# Patient Record
Sex: MALE | Race: WHITE | Employment: OTHER | URBAN - METROPOLITAN AREA
[De-identification: names, ages, dates, MRNs, and addresses within clinical notes are randomized per-mention and may not be internally consistent; named-entity substitution may affect disease eponyms.]

---

## 2019-04-11 ENCOUNTER — APPOINTMENT (OUTPATIENT)
Dept: GENERAL RADIOLOGY | Age: 64
End: 2019-04-11
Payer: COMMERCIAL

## 2019-04-11 ENCOUNTER — HOSPITAL ENCOUNTER (EMERGENCY)
Age: 64
Discharge: LEFT BEFORE DISCHARGE | End: 2019-04-11
Payer: COMMERCIAL

## 2019-04-11 ENCOUNTER — HOSPITAL ENCOUNTER (EMERGENCY)
Age: 64
Discharge: HOME | End: 2019-04-11
Attending: EMERGENCY MEDICINE
Payer: COMMERCIAL

## 2019-04-11 VITALS
HEART RATE: 80 BPM | SYSTOLIC BLOOD PRESSURE: 194 MMHG | OXYGEN SATURATION: 98 % | BODY MASS INDEX: 28.44 KG/M2 | DIASTOLIC BLOOD PRESSURE: 102 MMHG | RESPIRATION RATE: 14 BRPM | HEIGHT: 69 IN | TEMPERATURE: 97.2 F | WEIGHT: 192 LBS

## 2019-04-11 VITALS
SYSTOLIC BLOOD PRESSURE: 204 MMHG | BODY MASS INDEX: 27.49 KG/M2 | HEART RATE: 96 BPM | HEIGHT: 70 IN | OXYGEN SATURATION: 95 % | TEMPERATURE: 98.6 F | DIASTOLIC BLOOD PRESSURE: 100 MMHG | RESPIRATION RATE: 16 BRPM | WEIGHT: 192 LBS

## 2019-04-11 DIAGNOSIS — S61.213S: Primary | ICD-10-CM

## 2019-04-11 DIAGNOSIS — S62.635B OPEN DISPLACED FRACTURE OF DISTAL PHALANX OF LEFT RING FINGER, INITIAL ENCOUNTER: Primary | ICD-10-CM

## 2019-04-11 DIAGNOSIS — S62.609B OPEN FRACTURE OF PHALANX OF DIGIT OF HAND, INITIAL ENCOUNTER: ICD-10-CM

## 2019-04-11 LAB
ANION GAP SERPL CALCULATED.3IONS-SCNC: 13 MMOL/L (ref 7–16)
BASOPHILS ABSOLUTE: 0.05 E9/L (ref 0–0.2)
BASOPHILS RELATIVE PERCENT: 0.7 % (ref 0–2)
BUN BLDV-MCNC: 23 MG/DL (ref 8–23)
CALCIUM SERPL-MCNC: 9.2 MG/DL (ref 8.6–10.2)
CHLORIDE BLD-SCNC: 100 MMOL/L (ref 98–107)
CO2: 25 MMOL/L (ref 22–29)
CREAT SERPL-MCNC: 0.8 MG/DL (ref 0.7–1.2)
EOSINOPHILS ABSOLUTE: 0.21 E9/L (ref 0.05–0.5)
EOSINOPHILS RELATIVE PERCENT: 2.9 % (ref 0–6)
GFR AFRICAN AMERICAN: >60
GFR NON-AFRICAN AMERICAN: >60 ML/MIN/1.73
GLUCOSE BLD-MCNC: 92 MG/DL (ref 74–99)
HCT VFR BLD CALC: 44 % (ref 37–54)
HEMOGLOBIN: 15 G/DL (ref 12.5–16.5)
IMMATURE GRANULOCYTES #: 0.03 E9/L
IMMATURE GRANULOCYTES %: 0.4 % (ref 0–5)
LYMPHOCYTES ABSOLUTE: 1.97 E9/L (ref 1.5–4)
LYMPHOCYTES RELATIVE PERCENT: 27.1 % (ref 20–42)
MCH RBC QN AUTO: 31.9 PG (ref 26–35)
MCHC RBC AUTO-ENTMCNC: 34.1 % (ref 32–34.5)
MCV RBC AUTO: 93.6 FL (ref 80–99.9)
MONOCYTES ABSOLUTE: 0.7 E9/L (ref 0.1–0.95)
MONOCYTES RELATIVE PERCENT: 9.6 % (ref 2–12)
NEUTROPHILS ABSOLUTE: 4.31 E9/L (ref 1.8–7.3)
NEUTROPHILS RELATIVE PERCENT: 59.3 % (ref 43–80)
PDW BLD-RTO: 13 FL (ref 11.5–15)
PLATELET # BLD: 178 E9/L (ref 130–450)
PMV BLD AUTO: 10.7 FL (ref 7–12)
POTASSIUM SERPL-SCNC: 4.1 MMOL/L (ref 3.5–5)
RBC # BLD: 4.7 E12/L (ref 3.8–5.8)
SODIUM BLD-SCNC: 138 MMOL/L (ref 132–146)
WBC # BLD: 7.3 E9/L (ref 4.5–11.5)

## 2019-04-11 PROCEDURE — 6370000000 HC RX 637 (ALT 250 FOR IP): Performed by: NURSE PRACTITIONER

## 2019-04-11 PROCEDURE — 2580000003 HC RX 258: Performed by: NURSE PRACTITIONER

## 2019-04-11 PROCEDURE — 80048 BASIC METABOLIC PNL TOTAL CA: CPT

## 2019-04-11 PROCEDURE — 99284 EMERGENCY DEPT VISIT MOD MDM: CPT

## 2019-04-11 PROCEDURE — 96374 THER/PROPH/DIAG INJ IV PUSH: CPT

## 2019-04-11 PROCEDURE — 73130 X-RAY EXAM OF HAND: CPT

## 2019-04-11 PROCEDURE — 2500000003 HC RX 250 WO HCPCS: Performed by: NURSE PRACTITIONER

## 2019-04-11 PROCEDURE — 6360000002 HC RX W HCPCS: Performed by: NURSE PRACTITIONER

## 2019-04-11 PROCEDURE — 99283 EMERGENCY DEPT VISIT LOW MDM: CPT

## 2019-04-11 PROCEDURE — 85025 COMPLETE CBC W/AUTO DIFF WBC: CPT

## 2019-04-11 RX ORDER — ONDANSETRON 4 MG/1
4 TABLET, ORALLY DISINTEGRATING ORAL EVERY 8 HOURS PRN
Qty: 10 TABLET | Refills: 0 | Status: SHIPPED | OUTPATIENT
Start: 2019-04-11 | End: 2020-04-10

## 2019-04-11 RX ORDER — LIDOCAINE HYDROCHLORIDE 10 MG/ML
5 INJECTION, SOLUTION EPIDURAL; INFILTRATION; INTRACAUDAL; PERINEURAL ONCE
Status: COMPLETED | OUTPATIENT
Start: 2019-04-11 | End: 2019-04-11

## 2019-04-11 RX ORDER — HYDROCODONE BITARTRATE AND ACETAMINOPHEN 5; 325 MG/1; MG/1
1 TABLET ORAL ONCE
Status: COMPLETED | OUTPATIENT
Start: 2019-04-11 | End: 2019-04-11

## 2019-04-11 RX ORDER — CEPHALEXIN 500 MG/1
500 CAPSULE ORAL 3 TIMES DAILY
Qty: 30 CAPSULE | Refills: 0 | Status: SHIPPED | OUTPATIENT
Start: 2019-04-11 | End: 2019-04-21

## 2019-04-11 RX ORDER — HYDROCODONE BITARTRATE AND ACETAMINOPHEN 5; 325 MG/1; MG/1
1 TABLET ORAL EVERY 6 HOURS PRN
Qty: 20 TABLET | Refills: 0 | Status: SHIPPED | OUTPATIENT
Start: 2019-04-11 | End: 2019-04-16

## 2019-04-11 RX ADMIN — LIDOCAINE HYDROCHLORIDE 5 ML: 10 INJECTION, SOLUTION EPIDURAL; INFILTRATION; INTRACAUDAL; PERINEURAL at 16:39

## 2019-04-11 RX ADMIN — HYDROCODONE BITARTRATE AND ACETAMINOPHEN 1 TABLET: 5; 325 TABLET ORAL at 16:25

## 2019-04-11 RX ADMIN — WATER 2 G: 1 INJECTION INTRAMUSCULAR; INTRAVENOUS; SUBCUTANEOUS at 16:39

## 2019-04-11 ASSESSMENT — PAIN DESCRIPTION - LOCATION: LOCATION: FINGER (COMMENT WHICH ONE)

## 2019-04-11 ASSESSMENT — PAIN DESCRIPTION - PAIN TYPE: TYPE: ACUTE PAIN

## 2019-04-11 ASSESSMENT — PAIN SCALES - GENERAL
PAINLEVEL_OUTOF10: 3
PAINLEVEL_OUTOF10: 3

## 2019-04-11 NOTE — ED NOTES
Pt given AMA form to sign, pt given discharge instructions including going straight downtown ER and not eating or drinking. Pt agreeable to this. Britt Euceda RN  04/11/19 3279    Pt being driven by wife.      Britt Euceda RN  04/11/19 6193

## 2019-04-11 NOTE — ED PROVIDER NOTES
HPI:  4/11/19,   Time: 7:22 PM         Wagner Flores is a 62 y.o. male presenting to the ED for laceration to finger, beginning prior to arrival ago. The complaint has been persistent, moderate in severity, and worsened by nothing. Patient's a 81-year-old male who injured his left 3rd digit today, sustained a laceration as well as open fracture to the distal phalanx, was evaluated [facility] emergency department, they discussed with orthopedics who wanted patient to present here for orthopedic consultation. ROS:   Pertinent positives and negatives are stated within HPI, all other systems reviewed and are negative.  --------------------------------------------- PAST HISTORY ---------------------------------------------  Past Medical History:  has no past medical history on file. Past Surgical History:  has no past surgical history on file. Social History:  reports that he has been smoking. He has been smoking about 1.00 pack per day. He has never used smokeless tobacco. He reports that he drinks alcohol. He reports that he does not use drugs. Family History: family history is not on file. The patients home medications have been reviewed. Allergies: Patient has no known allergies. -------------------------------------------------- RESULTS -------------------------------------------------  All laboratory and radiology results have been personally reviewed by myself   LABS:  No results found for this visit on 04/11/19. RADIOLOGY:  Interpreted by Radiologist.  No orders to display       ------------------------- NURSING NOTES AND VITALS REVIEWED ---------------------------   The nursing notes within the ED encounter and vital signs as below have been reviewed.    BP (!) 194/102   Pulse 80   Temp 97.2 °F (36.2 °C)   Resp 14   Ht 5' 9\" (1.753 m)   Wt 192 lb (87.1 kg)   SpO2 98%   BMI 28.35 kg/m²   Oxygen Saturation Interpretation: Normal      ---------------------------------------------------PHYSICAL EXAM--------------------------------------      Constitutional/General: Alert and oriented x3, well appearing, non toxic in NAD  Head: NC/AT  Eyes: PERRL, EOMI  Mouth: Oropharynx clear, handling secretions, no trismus  Neck: Supple, full ROM, no meningeal signs  Pulmonary: Lungs clear to auscultation bilaterally, no wheezes, rales, or rhonchi. Not in respiratory distress  Cardiovascular:  Regular rate and rhythm, no murmurs, gallops, or rubs. 2+ distal pulses  Abdomen: Soft, non tender, non distended,   Extremities: Moves all extremities x 4. Warm and well perfused  Skin: warm and dry without rash  Neurologic: GCS 15,  Psych: Normal Affect      ------------------------------ ED COURSE/MEDICAL DECISION MAKING----------------------  Medications - No data to display      Medical Decision Making:    Patient was managed by orthopedics and will be discharged home to follow-up with Dr. Carla Mendes    Counseling: The emergency provider has spoken with the patient and spouse/SO and discussed todays results, in addition to providing specific details for the plan of care and counseling regarding the diagnosis and prognosis. Questions are answered at this time and they are agreeable with the plan.      --------------------------------- IMPRESSION AND DISPOSITION ---------------------------------    IMPRESSION  1.  Open displaced fracture of distal phalanx of left ring finger, initial encounter        DISPOSITION  Disposition: Discharge to home  Patient condition is good                  Malu Francisco MD  04/12/19 5289

## 2019-04-11 NOTE — PROGRESS NOTES
LUCAS Martinez requested for patient to be transferred to Main/ED-called the [address] @ 1712pm. Spoke with Ninoska Raman, informed her that LUCAS Navarrete (per Dr. Jocelynn Crow) spoke with Dr. Óscar Patiño (accepted, ED/Main) @ [address] [facility] pm. Also, patient is goig by private car. Print AMA form for nurse and gave nurse to nurse report [phone].

## 2019-04-11 NOTE — ED NOTES
Gave nurse to nurse to [address] [place] [address] ER nurse.      Carmen Valdez, RN  04/11/19 2819

## 2019-04-11 NOTE — ED PROVIDER NOTES
ED Attending  CC: Rubina        Department of Emergency Medicine   ED  Provider Note  Admit Date/RoomTime: 4/11/2019  3:12 PM  ED Room: /   Chief Complaint   Laceration (left third digit; lacerated on steel)    History of Present Illness   Source of history provided by:  patient. History/Exam Limitations: none. Kelle Nino is a 62 y.o. old male presenting to the emergency department by private vehicle, for Left middle finger laceration which occured at about [phone] today. The complaint occurred as a result of cutting himself with a sheet of metal while working at home. He immediately placed a non-sterile bandage on the finger. Previous injury: no. His pain is aggravated by movement, use and palpation and relieved by nothing. He states he is able to bend the finger at the proximal phalanx but not at any of the more distal joints. Tetanus Status: up to date within 3 years. He is right handed. He denies any other injury. There was no fall. No chest pain, shortness of breath, syncope, dizziness, or other symptoms. ROS    Pertinent positives and negatives are stated within HPI, all other systems reviewed and are negative. History reviewed. No pertinent surgical history. Social History:  reports that he has been smoking. He has been smoking about 1.00 pack per day. He has never used smokeless tobacco. He reports that he drinks alcohol. He reports that he does not use drugs. Family History: family history is not on file. Allergies: Patient has no known allergies. Physical Exam           ED Triage Vitals [04/11/19 1511]   BP Temp Temp Source Pulse Resp SpO2 Height Weight   (!) 204/100 98.6 °F (37 °C) Oral 96 16 95 % 5' 10\" (1.778 m) 192 lb (87.1 kg)     Oxygen Saturation Interpretation: Normal.    Constitutional:  Alert, development consistent with age. Neck:  Normal ROM. Supple. Non-tender.   Fingers:  Left Ring finger distal phalanx volar aspect:            There is a laceration at the distal phalanx 80.0 - 99.9 fL    MCH 31.9 26.0 - 35.0 pg    MCHC 34.1 32.0 - 34.5 %    RDW 13.0 11.5 - 15.0 fL    Platelets 724 375 - 655 E9/L    MPV 10.7 7.0 - 12.0 fL    Neutrophils % 59.3 43.0 - 80.0 %    Immature Granulocytes % 0.4 0.0 - 5.0 %    Lymphocytes % 27.1 20.0 - 42.0 %    Monocytes % 9.6 2.0 - 12.0 %    Eosinophils % 2.9 0.0 - 6.0 %    Basophils % 0.7 0.0 - 2.0 %    Neutrophils # 4.31 1.80 - 7.30 E9/L    Immature Granulocytes # 0.03 E9/L    Lymphocytes # 1.97 1.50 - 4.00 E9/L    Monocytes # 0.70 0.10 - 0.95 E9/L    Eosinophils # 0.21 0.05 - 0.50 E9/L    Basophils # 0.05 0.00 - 0.20 E9/L       Imaging: All Radiology results interpreted by Radiologist unless otherwise noted. XR HAND LEFT (MIN 3 VIEWS)   Final Result   Transverse fracture distal phalanx third digit                   ED Course / Medical Decision Making     Medications   lidocaine PF 1 % injection 5 mL (5 mLs Intradermal Given 4/11/19 1639)   ceFAZolin (ANCEF) 2 g in sterile water 20 mL IV syringe (2 g Intravenous Given 4/11/19 1639)   HYDROcodone-acetaminophen (NORCO) 5-325 MG per tablet 1 tablet (1 tablet Oral Given 4/11/19 1625)     ED Course as of Apr 11 1807   Thu Apr 11, 2019   1546 Laceration to left ring finger patient does not want narcotics at this time and will xray and give motrin. [SE]   [phone] Dr. Violetta Love into examine patient and will give Ancef 2 Gm and consult hand surgeon.     [SE]      ED Course User Index  [SE] SERENITY Roach - CNP     Consult(s):   IP CONSULT TO ORTHOPEDIC SURGERY    [facility] 81.- Spoke to Dr. Tatyana Penny. He advised to send patient to [facility] ER to be seen by orthopedics monty. 0292- Spoke to Dr. Kerrie Saldivar at [facility] ER who accepted the pt. Procedure(s):   none    Medical Decision Making:    Spoke to ortho attending (Dr. Tatyana Penny) who advised to send patient to [facility] ER to see ortho resident monty. Dr. Tatyana Penny advised to apply zeroform gauze and an aluminum splint prior to transport.  Due to open fracture and dirty conditions, Ancef 2gm IV was given prior to transfer as well. 4/11/19 / Time: 2583  This patient has chosen to leave against medical advice and does not want to pay for ambulance and his wife will drive him to the ED. I have personally explained to them that choosing to do so may result in permanent bodily harm or death. I discussed at length that without further evaluation and monitoring there may be unforeseen circumstances and deterioration resulting in permanent bodily harm or death as a result of their choice. They are alert, oriented, and competent at this time. They state that they are aware of the serious risks as explained, but they continue to wish to leave against medical advice. In light of their decision to leave against medical advice, follow-up has been arranged and they are aware of the importance of following up as instructed. They have been advised that they should return to the ED immediately if they change their mind at any time, or if their condition begins to change or worsen. ------------------------------------------------------------------------------------------      Counseling: The emergency provider has spoken with the patient and wife and discussed todays results, in addition to providing specific details for the plan of care and counseling regarding the diagnosis and prognosis. The patient and wife do not want an ambulance to transfer to [facility] ED. The risks of refusing ambulance transportation were discussed. Patient is of sound mind and judgement and wants his wife to drive him. He will sign AMA in order to go by private car. I advised the patient and his wife that he is to have nothing to eat or drink and is to go straight to [facility] without stopping anywhere else. Patient and his wife verbalized understanding of this and state they will go straight to the ED. Questions are answered at this time and they are agreeable with the plan. Assessment      1. Laceration of left middle finger without foreign body, nail damage status unspecified, sequela    2. Open fracture of phalanx of digit of hand, initial encounter      Plan   Other Disposition: Left AMA but will report directly to [facility] ED. Patient condition is stable    New Medications     Discharge Medication List as of 4/11/2019  5:39 PM        Electronically signed by SERENITY Saha CNP   DD: 4/11/19  **This report was transcribed using voice recognition software. Every effort was made to ensure accuracy; however, inadvertent computerized transcription errors may be present.   END OF ED PROVIDER NOTE       SERENITY Saha CNP  04/11/19 0485

## 2019-04-12 NOTE — CONSULTS
Department of Orthopedic Surgery  Resident Consult Note          Reason for Consult:  Left hand pain    HISTORY OF PRESENT ILLNESS:       Patient is a 62 y.o. male who presents with left hand pain after a piece of metal fell onto his 3nd finger earlier today. He is not complaining of pain anywhere other than the 3rd digit. He received a dose of Ancef at an outside facility and was transferred here for further evaluation. Patient states that he is right-hand dominant. 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 Denies numbness/tingling/paresthesias. Denies any other orthopedic complaints at this time. Past Medical History:    No past medical history on file. Past Surgical History:    No past surgical history on file. Current Medications:   Current Facility-Administered Medications: lidocaine 1 % injection 10 mL, 10 mL, Intra-articular, See Admin Instructions  Allergies:  Patient has no known allergies. Social History:   TOBACCO:   reports that he has been smoking. He has been smoking about 1.00 pack per day. He has never used smokeless tobacco.  ETOH:   reports that he drinks alcohol. DRUGS:   reports that he does not use drugs. ACTIVITIES OF DAILY LIVING:    OCCUPATION:    Family History:   No family history on file.     REVIEW OF SYSTEMS:  CONSTITUTIONAL:  negative for  fevers, chills  EYES:  negative for blurred vision, visual disturbance  HEENT:  negative for  hearing loss, voice change  RESPIRATORY:  negative for  dyspnea, wheezing  CARDIOVASCULAR:  negative for  chest pain, palpitations  GASTROINTESTINAL:  negative for nausea, vomiting  GENITOURINARY:  negative for frequency, urinary incontinence  HEMATOLOGIC/LYMPHATIC:  negative for bleeding and petechiae  MUSCULOSKELETAL:  positive for  Pain left hand  NEUROLOGICAL:  negative for headaches, dizziness  BEHAVIOR/PSYCH:  negative for increased agitation and anxiety    PHYSICAL EXAM:    VITALS:  BP (!) 194/102   Pulse 80   Temp 97.2 °F (36.2 °C)   Resp 14   Ht 5' 9\" (1.753 m)   Wt 192 lb (87.1 kg)   SpO2 98%   BMI 28.35 kg/m²   CONSTITUTIONAL:  awake, alert, cooperative, no apparent distress, and appears stated age  MUSCULOSKELETAL:  Left upper Extremity:  · There is a 2 cm laceration over the radial and dorsal aspect of the 3rd digit at the distal phalanx. Laceration extends through the germinal matrix of the nailbed. · Sensations intact to light touch in the radial and ulnar digital nerve distributions to be tip of the finger  · Good capillary refill to the tip of the digit. · Patient is able to flex the DIP and PIP joint of the 3rd digit  · Patient is able to extend the DIP and PIP joint of the 3rd digit  · Patient able to flex and extend the DIP and PIP joints of the remainder of the digits. · Positive tender to palpation at the distal phalanx  · No tenderness to the remainder of the hand, compartment soft and compressible to the hand. · Bleeding controlled  · +2/4 radial pulse    Secondary Exam:   · rightUE: No obvious signs of trauma. -TTP to fingers, hand, wrist, forearm, elbow, humerus, shoulder or clavicle. -- Patient able to flex/extend fingers, wrist, elbow and shoulder with active and passive ROM without pain, +2/4 Radial pulse, cap refill <3sec, +AIN/PIN/Radial/Ulnar/Median N, distal sensation grossly intact to C4-T1 dermatomes, compartments soft and compressible. · bilateralLE: No obvious signs of trauma. -TTP to foot, ankle, leg, knee, thigh, hip.-- Patient able to flex/extend toes, ankle, knee and hip with active and passive ROM without pain,+2/4 DP & PT pulses, cap refill <3sec, +5/5 PF/DF/EHL, distal sensation grossly intact to L4-S1 dermatomes, compartments soft and compressible.     · Pelvis: -TTP, -Log roll, -Heel strike     DATA:    CBC:   Lab Results   Component Value Date    WBC 7.3 04/11/2019    RBC 4.70 04/11/2019    HGB 15.0 04/11/2019    HCT 44.0 04/11/2019    MCV 93.6 04/11/2019    MCH 31.9 04/11/2019    MCHC 34.1 04/11/2019    RDW 13.0 04/11/2019     04/11/2019    MPV 10.7 04/11/2019     PT/INR:  No results found for: PROTIME, INR    Radiology Review:  X-ray left hand    Demonstrating a transverse fracture through the distal phalanx of the 3rd digit at the middle to proximal 3rd of the shaft. With some volar displacement of the distal fragment    IMPRESSION:  · Open transverse fracture of the distal phalanx of the 3rd digit    PLAN:  · After informed consent the skin was cleaned and a digital block was performed with 10 mL of 1% lidocaine to the 3rd digit of the left hand. The distal wound was copiously irrigated and all debris was removed from the wound. The laceration was closed loosely with a 3-0 Prolene suture. Sterile dressing was applied to the distal phalanx and the patient was placed into a radial gutter splint in intrinsic plus position. · Nonweightbearing to the left upper extremity  · Patient given Ancef in the ED   · Tetanus prophylaxis per ED  · Patient will be given a prescription for Keflex 500 mg 3 times a day for 10 days  · Patient should follow up with Dr. Sammy Brady on Monday  · Instructed patient to return to the ED if his symptoms worsen or he develops fever or chills or increasing pain to the hand. Patient understood and is in agreement.   · Discussed with Dr. Sammy Brady

## 2019-04-12 NOTE — ED NOTES
Discharge instructions reviewed , including diagnosis, medications, follow up appointments, home care, and also when to call 911. All discharge instructions questions answered.        Pt left ED ambulatory         Ford Draper RN  04/11/19 2045

## 2020-02-20 ENCOUNTER — INITIAL CONSULT (OUTPATIENT)
Dept: NEUROSURGERY | Age: 65
End: 2020-02-20
Payer: COMMERCIAL

## 2020-02-20 VITALS
HEIGHT: 69 IN | SYSTOLIC BLOOD PRESSURE: 179 MMHG | BODY MASS INDEX: 28.2 KG/M2 | HEART RATE: 99 BPM | DIASTOLIC BLOOD PRESSURE: 101 MMHG | WEIGHT: 190.4 LBS

## 2020-02-20 PROCEDURE — 99203 OFFICE O/P NEW LOW 30 MIN: CPT | Performed by: PHYSICIAN ASSISTANT

## 2020-02-20 RX ORDER — ATORVASTATIN CALCIUM 20 MG/1
TABLET, FILM COATED ORAL
COMMUNITY

## 2020-02-20 RX ORDER — TRAMADOL HYDROCHLORIDE 50 MG/1
TABLET ORAL
COMMUNITY
Start: 2020-02-14 | End: 2020-05-15

## 2020-02-20 RX ORDER — ENALAPRIL MALEATE 5 MG/1
TABLET ORAL
COMMUNITY
Start: 2020-01-24

## 2020-02-20 RX ORDER — HYDROCHLOROTHIAZIDE 12.5 MG/1
TABLET ORAL
COMMUNITY
Start: 2019-06-24

## 2020-02-20 ASSESSMENT — ENCOUNTER SYMPTOMS
ABDOMINAL PAIN: 0
BACK PAIN: 1
SHORTNESS OF BREATH: 0
PHOTOPHOBIA: 0
TROUBLE SWALLOWING: 0

## 2020-03-03 NOTE — PROGRESS NOTES
History    Marital status:      Spouse name: Not on file    Number of children: Not on file    Years of education: Not on file    Highest education level: Not on file   Occupational History    Not on file   Social Needs    Financial resource strain: Not on file    Food insecurity:     Worry: Not on file     Inability: Not on file    Transportation needs:     Medical: Not on file     Non-medical: Not on file   Tobacco Use    Smoking status: Current Every Day Smoker     Packs/day: 1.00    Smokeless tobacco: Never Used   Substance and Sexual Activity    Alcohol use: Yes     Comment: occ.  Drug use: No    Sexual activity: Not on file   Lifestyle    Physical activity:     Days per week: Not on file     Minutes per session: Not on file    Stress: Not on file   Relationships    Social connections:     Talks on phone: Not on file     Gets together: Not on file     Attends Denominational service: Not on file     Active member of club or organization: Not on file     Attends meetings of clubs or organizations: Not on file     Relationship status: Not on file    Intimate partner violence:     Fear of current or ex partner: Not on file     Emotionally abused: Not on file     Physically abused: Not on file     Forced sexual activity: Not on file   Other Topics Concern    Not on file   Social History Narrative    Not on file       No family history on file. REVIEW OF SYSTEMS:     Patient specifically denies fever/chills, chest pain, shortness of breath, new bowel or bladder complaints. All other review of systems was negative.     PHYSICAL EXAMINATION:      BP (!) 170/90   Pulse 96   Temp 98.9 °F (37.2 °C) (Oral)   Resp 16   SpO2 99%     General:      General appearance:pleasant and well-hydrated, in no distress and A & O x3  Build:Normal Weight  Function:Rises from a seated position with difficulty    HEENT:    Head:normocephalic, atraumatic  Pupils:regular, round, equal  Sclera: icterus absent    Lungs:    Breathing:normal breathing pattern    Abdomen:    Shape:non-distended and normal  Tenderness:none  Guarding:none    Lumbar spine:    Spine inspection:normal   CVA tenderness:No   Palpation:tenderness paravertebral muscles, left, right and positive. Range of motion:abnormal mildly Lateral bending, flexion, extension rotation bilateral and is  painful. Musculoskeletal:    Trigger points in Paraveteral:absent bilaterally  SI joint tenderness:positive right, negative left              USAMA test:negative right, not done             left  Piriformis tenderness:positive right, negative left  Trochanteric bursa tenderness:positive right, negative left  SLR:positive right, negative left, sitting     Extremities:    Tremors:None bilaterally upper and lower  Range of motion:pain with internal rotation of hips negative. Intact:Yes  Varicose veins:absent   Pulses:present Lt radial  Cyanosis:none  Edema:none x all 4 extremities    Neurological:    Sensory:normal to light touch BLE  Motor:                Right Quadriceps5/5          Left Quadriceps5/5           Right Gastrocnemius5/5    Left Gastrocnemius5/5  Right Ant Tibialis5/5  Left Ant Tibialis5/5    Reflexes:    Right Quadriceps reflex2+  Left Quadriceps reflex2+  Right Achilles reflex2+  Left Achilles reflex2+  Gait:normal station    Dermatology:    Skin:no rashes or lesions noted    Assessment/Plan:   Rt LBP RLE pain   MRI demonstrates disc bulges causing narrowing most severe at L4-5, right greater than left.                 Works doing metal fabrication (self-employed)    Reviewed referral documents and imaging  Urine screen today  OARRS report reviewed  Failed Meloxicam, cyclobenzaprine  Gabapentin 300 mg titration up to TID as tolerated, discussed common SE's, discussed no concomittant alcohol use, discussed monitoring for SE's and safety with his work w/ heavy machinery, encouraged to trial on weekend before going to work  Right L4 and L5

## 2020-03-04 ENCOUNTER — OFFICE VISIT (OUTPATIENT)
Dept: PAIN MANAGEMENT | Age: 65
End: 2020-03-04
Payer: COMMERCIAL

## 2020-03-04 ENCOUNTER — HOSPITAL ENCOUNTER (OUTPATIENT)
Age: 65
Discharge: HOME | End: 2020-03-06
Payer: COMMERCIAL

## 2020-03-04 ENCOUNTER — PREP FOR PROCEDURE (OUTPATIENT)
Dept: PAIN MANAGEMENT | Age: 65
End: 2020-03-04

## 2020-03-04 VITALS
SYSTOLIC BLOOD PRESSURE: 170 MMHG | DIASTOLIC BLOOD PRESSURE: 90 MMHG | RESPIRATION RATE: 16 BRPM | OXYGEN SATURATION: 99 % | TEMPERATURE: 98.9 F | HEART RATE: 96 BPM

## 2020-03-04 PROBLEM — M48.062 SPINAL STENOSIS OF LUMBAR REGION WITH NEUROGENIC CLAUDICATION: Status: ACTIVE | Noted: 2020-03-04

## 2020-03-04 PROBLEM — M54.16 LUMBAR RADICULOPATHY: Status: ACTIVE | Noted: 2020-03-04

## 2020-03-04 PROBLEM — M51.9 LUMBAR DISC DISORDER: Status: ACTIVE | Noted: 2020-03-04

## 2020-03-04 PROBLEM — G89.4 CHRONIC PAIN SYNDROME: Status: ACTIVE | Noted: 2020-03-04

## 2020-03-04 PROCEDURE — G0480 DRUG TEST DEF 1-7 CLASSES: HCPCS

## 2020-03-04 PROCEDURE — 80307 DRUG TEST PRSMV CHEM ANLYZR: CPT

## 2020-03-04 PROCEDURE — 99204 OFFICE O/P NEW MOD 45 MIN: CPT | Performed by: PAIN MEDICINE

## 2020-03-04 RX ORDER — GABAPENTIN 300 MG/1
CAPSULE ORAL
Qty: 81 CAPSULE | Refills: 0 | Status: SHIPPED
Start: 2020-03-04 | End: 2020-04-03 | Stop reason: SDUPTHER

## 2020-03-05 LAB — SPECIFIC GRAVITY UA: 1.02 (ref 1–1.03)

## 2020-03-08 LAB
6AM URINE: <10 NG/ML
7-AMINOCLONAZEPAM, URINE: <5 NG/ML
ALPHA-HYDROXYALPRAZOLAM, URINE: <5 NG/ML
ALPHA-HYDROXYMIDAZOLAM, URINE: <20 NG/ML
ALPRAZOLAM, URINE: <5 NG/ML
CHLORDIAZEPOXIDE, URINE: <20 NG/ML
CLONAZEPAM, URINE: <5 NG/ML
CODEINE, URINE: <20 NG/ML
DIAZEPAM, URINE: <20 NG/ML
HYDROCODONE, URINE: <20 NG/ML
HYDROMORPHONE, URINE: <20 NG/ML
LORAZEPAM, URINE: <20 NG/ML
MIDAZOLAM, URINE: <20 NG/ML
MORPHINE URINE: <20 NG/ML
NORDIAZEPAM, URINE: <20 NG/ML
NORHYDROCODONE, URINE: <20 NG/ML
NOROXYCODONE, URINE: <20 NG/ML
NOROXYMORPHONE, URINE: <20 NG/ML
OXAZEPAM, URINE: <20 NG/ML
OXYCODONE, URINE CONFIRMATION: <20 NG/ML
OXYMORPHONE, URINE: <20 NG/ML
TEMAZEPAM, URINE: <20 NG/ML

## 2020-03-10 LAB
Lab: NORMAL
REPORT: NORMAL
THIS TEST SENT TO: NORMAL

## 2020-03-25 ENCOUNTER — TELEPHONE (OUTPATIENT)
Dept: PAIN MANAGEMENT | Age: 65
End: 2020-03-25

## 2020-04-03 ENCOUNTER — VIRTUAL VISIT (OUTPATIENT)
Dept: PAIN MANAGEMENT | Age: 65
End: 2020-04-03
Payer: COMMERCIAL

## 2020-04-03 PROCEDURE — 99213 OFFICE O/P EST LOW 20 MIN: CPT | Performed by: PAIN MEDICINE

## 2020-04-03 RX ORDER — GABAPENTIN 300 MG/1
300 CAPSULE ORAL 4 TIMES DAILY
Qty: 120 CAPSULE | Refills: 0 | Status: SHIPPED
Start: 2020-04-03 | End: 2020-05-01 | Stop reason: SDUPTHER

## 2020-04-03 NOTE — PROGRESS NOTES
regarding steps to help prevent the spread of COVID-19   SOURCE - https://chetan-hayes.info/. html     1-Stay home except to get medical care  2-Clean your hands often for atleast 20 seconds, avoid touching: Avoid touching your eyes, nose, and mouth with unwashed hands. 3-Seek medical attention: Seek prompt medical attention if your illness is worsening (e.g., difficulty breathing). Call you doctor first.  3-Wear a facemask if you are sick   4-Cover your coughs and sneezes           I affirm this is a Patient Initiated Episode with an established Patient who has not had a related appointment within my department in the past 7 days or scheduled within the next 24 hours.     Total Time: 20 minutes with >50% spent in face to face contact    Cc: Referring physician

## 2020-04-27 ENCOUNTER — TELEPHONE (OUTPATIENT)
Dept: PAIN MANAGEMENT | Age: 65
End: 2020-04-27

## 2020-05-01 ENCOUNTER — VIRTUAL VISIT (OUTPATIENT)
Dept: PAIN MANAGEMENT | Age: 65
End: 2020-05-01
Payer: COMMERCIAL

## 2020-05-01 PROCEDURE — 99213 OFFICE O/P EST LOW 20 MIN: CPT | Performed by: PAIN MEDICINE

## 2020-05-01 RX ORDER — GABAPENTIN 300 MG/1
300 CAPSULE ORAL 4 TIMES DAILY
Qty: 120 CAPSULE | Refills: 0 | Status: SHIPPED
Start: 2020-05-01 | End: 2020-05-27 | Stop reason: SDUPTHER

## 2020-05-05 ENCOUNTER — TELEPHONE (OUTPATIENT)
Dept: PAIN MANAGEMENT | Age: 65
End: 2020-05-05

## 2020-05-05 NOTE — TELEPHONE ENCOUNTER
Call to Barbara Anaya that procedure was approved for 5/19/2020 and that the surgery center should call him a few days before for the pre op call and after 3:00 PM the business day before with the arrival time. Instructed Willis to hold ibuprofen for 24 hours, naprosyn for 4 days and any aspirin containing products for 7 days. Instructed to call office back if any questions. Instructed of need for COVID-19 testing and self quarantining. Mariel Lockhart verbalized understanding.     Mariel Lockhart 's response to the following screening questions:    Fever: No  Headache:  No  Cough: No  Shortness of breath: No  Exposed to anyone with these symptoms: No

## 2020-05-15 ENCOUNTER — HOSPITAL ENCOUNTER (OUTPATIENT)
Age: 65
Discharge: HOME | End: 2020-05-17
Payer: COMMERCIAL

## 2020-05-15 PROCEDURE — U0003 INFECTIOUS AGENT DETECTION BY NUCLEIC ACID (DNA OR RNA); SEVERE ACUTE RESPIRATORY SYNDROME CORONAVIRUS 2 (SARS-COV-2) (CORONAVIRUS DISEASE [COVID-19]), AMPLIFIED PROBE TECHNIQUE, MAKING USE OF HIGH THROUGHPUT TECHNOLOGIES AS DESCRIBED BY CMS-2020-01-R: HCPCS

## 2020-05-15 RX ORDER — IRBESARTAN 150 MG/1
TABLET ORAL
COMMUNITY
Start: 2020-05-05

## 2020-05-15 NOTE — PROGRESS NOTES
Have you been tested for COVID       Yes              Have you been told you were positive for COVID   No  Have you had any known exposure to someone that is positive for COVID   No  Do you have a cough    No              Do you have shortness of breath       No                 Do you have a sore throat       No                Are you having chills          No                Are you having muscle aches     Yes arthritis                    Please come to the hospital wearing a mask and have your significant other wear a mask as well. Both of you should check your temperature before leaving to come here,  if it is 100 or higher please call [phone] for instruction.

## 2020-05-15 NOTE — PROGRESS NOTES
[facility]  INSTRUCTIONS    . ......................................................................................................................................... Cydney Degroot the day of Surgery is located in the [facility]. Upon entering the door, make an immediate right to the surgery reception desk    X  Bring photo ID and insurance card     XYou may have a light breakfast day of procedure    X  Wear loose comfortable clothing    X  Please follow instructions for medications as given per Dr's office     X Stop blood thinners as per Dr's office instructions    X You can expect a call the business day prior to procedure to notify you if your arrival time changes    X Please arrange for     ?   Other instructions

## 2020-05-17 LAB
SARS-COV-2: NOT DETECTED
SOURCE: NORMAL

## 2020-05-19 ENCOUNTER — HOSPITAL ENCOUNTER (OUTPATIENT)
Age: 65
Setting detail: OUTPATIENT SURGERY
Discharge: HOME | End: 2020-05-19
Attending: PAIN MEDICINE | Admitting: PAIN MEDICINE
Payer: COMMERCIAL

## 2020-05-19 ENCOUNTER — HOSPITAL ENCOUNTER (OUTPATIENT)
Dept: GENERAL RADIOLOGY | Age: 65
Setting detail: OUTPATIENT SURGERY
Discharge: HOME | End: 2020-05-21
Attending: PAIN MEDICINE
Payer: COMMERCIAL

## 2020-05-19 VITALS
TEMPERATURE: 97.1 F | OXYGEN SATURATION: 98 % | DIASTOLIC BLOOD PRESSURE: 83 MMHG | RESPIRATION RATE: 18 BRPM | HEIGHT: 70 IN | HEART RATE: 74 BPM | WEIGHT: 193 LBS | SYSTOLIC BLOOD PRESSURE: 152 MMHG | BODY MASS INDEX: 27.63 KG/M2

## 2020-05-19 PROCEDURE — 3600000002 HC SURGERY LEVEL 2 BASE: Performed by: PAIN MEDICINE

## 2020-05-19 PROCEDURE — 3600000012 HC SURGERY LEVEL 2 ADDTL 15MIN: Performed by: PAIN MEDICINE

## 2020-05-19 PROCEDURE — 2500000003 HC RX 250 WO HCPCS: Performed by: PAIN MEDICINE

## 2020-05-19 PROCEDURE — 7100000010 HC PHASE II RECOVERY - FIRST 15 MIN: Performed by: PAIN MEDICINE

## 2020-05-19 PROCEDURE — 64484 NJX AA&/STRD TFRM EPI L/S EA: CPT | Performed by: PAIN MEDICINE

## 2020-05-19 PROCEDURE — 6360000002 HC RX W HCPCS: Performed by: PAIN MEDICINE

## 2020-05-19 PROCEDURE — 64483 NJX AA&/STRD TFRM EPI L/S 1: CPT | Performed by: PAIN MEDICINE

## 2020-05-19 PROCEDURE — 3209999900 FLUORO FOR SURGICAL PROCEDURES

## 2020-05-19 PROCEDURE — 7100000011 HC PHASE II RECOVERY - ADDTL 15 MIN: Performed by: PAIN MEDICINE

## 2020-05-19 PROCEDURE — 2709999900 HC NON-CHARGEABLE SUPPLY: Performed by: PAIN MEDICINE

## 2020-05-19 PROCEDURE — 6360000004 HC RX CONTRAST MEDICATION: Performed by: PAIN MEDICINE

## 2020-05-19 RX ORDER — LIDOCAINE HYDROCHLORIDE 5 MG/ML
INJECTION, SOLUTION INFILTRATION; INTRAVENOUS PRN
Status: DISCONTINUED | OUTPATIENT
Start: 2020-05-19 | End: 2020-05-19 | Stop reason: ALTCHOICE

## 2020-05-19 RX ORDER — METHYLPREDNISOLONE ACETATE 40 MG/ML
INJECTION, SUSPENSION INTRA-ARTICULAR; INTRALESIONAL; INTRAMUSCULAR; SOFT TISSUE PRN
Status: DISCONTINUED | OUTPATIENT
Start: 2020-05-19 | End: 2020-05-19 | Stop reason: ALTCHOICE

## 2020-05-19 ASSESSMENT — PAIN DESCRIPTION - LOCATION: LOCATION: BACK

## 2020-05-19 ASSESSMENT — PAIN SCALES - GENERAL
PAINLEVEL_OUTOF10: 4
PAINLEVEL_OUTOF10: 5

## 2020-05-19 ASSESSMENT — PAIN DESCRIPTION - PROGRESSION
CLINICAL_PROGRESSION: GRADUALLY IMPROVING
CLINICAL_PROGRESSION: GRADUALLY IMPROVING

## 2020-05-19 ASSESSMENT — PAIN - FUNCTIONAL ASSESSMENT: PAIN_FUNCTIONAL_ASSESSMENT: 0-10

## 2020-05-19 ASSESSMENT — PAIN DESCRIPTION - PAIN TYPE: TYPE: ACUTE PAIN

## 2020-05-19 ASSESSMENT — PAIN DESCRIPTION - ORIENTATION: ORIENTATION: LOWER

## 2020-05-19 ASSESSMENT — PAIN DESCRIPTION - FREQUENCY: FREQUENCY: CONTINUOUS

## 2020-05-19 ASSESSMENT — PAIN DESCRIPTION - DESCRIPTORS: DESCRIPTORS: DISCOMFORT

## 2020-05-19 NOTE — PROGRESS NOTES
0957: Discharge instructions reviewed, verbalized understanding. 1009: Steady gait, denies any new numbness or tingling.

## 2020-05-19 NOTE — H&P
[facility] Pain Management        [address] [facility], [address]  Dept: [phone]    Procedure History & Physical      Chilo Riley     HPI:    Patient  is here for RLE pain for right L4 and L5 TFESI  Labs/imaging studies reviewed   All question and concerns addressed including R/B/A associated with the procedure    Past Medical History:   Diagnosis Date    HTN (hypertension)     Hyperlipidemia        Past Surgical History:   Procedure Laterality Date    FINGER SURGERY Left 04/2019    middle finger re-attached       Prior to Admission medications    Medication Sig Start Date End Date Taking? Authorizing Provider   irbesartan (AVAPRO) 150 MG tablet  5/5/20  Yes Historical Provider, MD   aspirin 81 MG tablet Take 81 mg by mouth daily   Yes Historical Provider, MD   gabapentin (NEURONTIN) 300 MG capsule Take 1 capsule by mouth 4 times daily for 30 days. 5/1/20 5/31/20 Yes Karron Mention, DO   atorvastatin (LIPITOR) 20 MG tablet Take 1 tablet every day by oral route. Yes Historical Provider, MD   hydrochlorothiazide (HYDRODIURIL) 12.5 MG tablet Take 1 tablet 3 times a week by oral route.  6/24/19  Yes Historical Provider, MD   enalapril (VASOTEC) 5 MG tablet  1/24/20  Yes Historical Provider, MD   adalimumab (HUMIRA) 40 MG/0.8ML injection Inject 40 mg into the skin Every 2 weeks   Yes Historical Provider, MD       No Known Allergies    Social History     Socioeconomic History    Marital status:      Spouse name: Not on file    Number of children: Not on file    Years of education: Not on file    Highest education level: Not on file   Occupational History    Not on file   Social Needs    Financial resource strain: Not on file    Food insecurity     Worry: Not on file     Inability: Not on file    Transportation needs     Medical: Not on file     Non-medical: Not on file   Tobacco Use    Smoking status: Current Every Day Smoker     Packs/day: 1.00     Start date: 5/1/1974    Smokeless

## 2020-05-27 ENCOUNTER — PREP FOR PROCEDURE (OUTPATIENT)
Dept: PAIN MANAGEMENT | Age: 65
End: 2020-05-27

## 2020-05-27 ENCOUNTER — OFFICE VISIT (OUTPATIENT)
Dept: PAIN MANAGEMENT | Age: 65
End: 2020-05-27
Payer: COMMERCIAL

## 2020-05-27 VITALS
BODY MASS INDEX: 27.63 KG/M2 | RESPIRATION RATE: 16 BRPM | OXYGEN SATURATION: 98 % | TEMPERATURE: 98.7 F | SYSTOLIC BLOOD PRESSURE: 134 MMHG | WEIGHT: 193 LBS | DIASTOLIC BLOOD PRESSURE: 88 MMHG | HEART RATE: 86 BPM | HEIGHT: 70 IN

## 2020-05-27 PROCEDURE — 99213 OFFICE O/P EST LOW 20 MIN: CPT | Performed by: PAIN MEDICINE

## 2020-05-27 RX ORDER — GABAPENTIN 300 MG/1
300 CAPSULE ORAL 4 TIMES DAILY
Qty: 120 CAPSULE | Refills: 0 | Status: SHIPPED
Start: 2020-05-27 | End: 2020-06-26 | Stop reason: SDUPTHER

## 2020-05-27 NOTE — PROGRESS NOTES
Do you currently have any of the following:    Fever: No  Headache:  No  Cough: No  Shortness of breath: No  Exposed to anyone with these symptoms: No         Kasia Servin presents to the [facility] on 5/27/2020. Carlos Alberto Awad is complaining of pain lower back . The pain is intermittent. The pain is described as stabbing. Pain is rated on his best day at a 2, on his worst day at a 5, and on average at a 4 on the VAS scale. He took his last dose of Neurontin      Any procedures since your last visit: Yes, with 90 % relief. Pacemaker or defibrilator: No managed by     He is not on NSAIDS and is  on anticoagulation medications to include ASA and is managed by     /88   Pulse 86   Temp 98.7 °F (37.1 °C) (Oral)   Resp 16   Ht 5' 10\" (1.778 m)   Wt 193 lb (87.5 kg)   SpO2 98%   BMI 27.69 kg/m²      No LMP for male patient.
well-hydrated, in no distress and A & O x3  Build:Normal Weight  Function:Rises from a seated position easily     HEENT:     Head:normocephalic, atraumatic  Pupils:regular, round, equal  Sclera: icterus absent     Lungs:     Breathing:normal breathing pattern     Abdomen:     Shape:non-distended and normal  Tenderness:none  Guarding:none     Lumbar spine:     Spine inspection:normal   CVA tenderness:No   Palpation:tenderness paravertebral muscles, left, right negative. Range of motion:normal in lateral bending, flexion, extension rotation bilateral and is painful.     Musculoskeletal:     Trigger points in Paraveteral:absent bilaterally  SI joint tenderness:negative right, negative left              USAMA test:negative right, not done left  Piriformis tenderness:negative right, negative left  Trochanteric bursa tenderness:negative right, negative left  SLR:negative right, negative left, sitting      Extremities:     Tremors:None bilaterally upper and lower  Range of motion:pain with internal rotation of hips negative. Intact:Yes  Varicose veins:absent   Pulses:present Lt radial  Cyanosis:none  Edema:none x all 4 extremities     Neurological:     Sensory:normal to light touch BLE  Motor:                Right Quadriceps5/5          Left Quadriceps5/5           Right Gastrocnemius5/5    Left Gastrocnemius5/5  Right Ant Tibialis5/5  Left Ant Tibialis5/5     Reflexes:    Right Quadriceps reflex2+  Left Quadriceps reflex2+  Right Achilles reflex2+  Left Achilles reflex2+  Gait:normal station     Dermatology:     Skin:no rashes or lesions noted      Assessment/Plan:     Rt LBP RLE pain              MRI demonstrates disc bulges causing narrowing most severe at L4-5, right greater than left.                 Works doing metal fabrication (self-employed)     Plan:    OARRS report reviewed  Failed Meloxicam, cyclobenzaprine  Continue Gabapentin 300 mg titration 300/300/600 as tolerated, discussed common SE's, discussed no

## 2020-06-26 ENCOUNTER — VIRTUAL VISIT (OUTPATIENT)
Dept: PAIN MANAGEMENT | Age: 65
End: 2020-06-26
Payer: COMMERCIAL

## 2020-06-26 PROCEDURE — 99213 OFFICE O/P EST LOW 20 MIN: CPT | Performed by: PAIN MEDICINE

## 2020-06-26 RX ORDER — GABAPENTIN 300 MG/1
300 CAPSULE ORAL 4 TIMES DAILY
Qty: 120 CAPSULE | Refills: 1 | Status: SHIPPED
Start: 2020-06-26 | End: 2020-08-26 | Stop reason: SDUPTHER

## 2020-06-26 NOTE — PROGRESS NOTES
Cami Jang was read the following message We want to confirm that, for purposes of billing, this is a virtual visit with your provider for which we will submit a claim for reimbursement with your insurance company. You will be responsible for any copays, coinsurance amounts or other amounts not covered by your insurance company. If you do not accept this, unfortunately we will not be able to schedule a virtual visit with the provider. Do you accept? Smith Velázquez responded Yes .

## 2020-08-21 NOTE — PROGRESS NOTES
[address] [facility], [address] [place] [address] [place]  [phone]    Tele health follow up Note      Nida Jacquie     Date of Visit:  8/26/2020    CC:  Tele health follow up   Chief Complaint   Patient presents with    Pain     back and leg        Consent:  The patient and/or health care decision maker is aware that he/she may receive a bill for this tele-health service [facility], depending on his insurance coverage, and has provided verbal consent to proceed: Yes  I have considered the risks of abuse, dependence, addiction and diversion. My patient is aware that they will need a follow-up visit (in-person or virtually) at the appropriate time indicated for continued medications. Further, my patient is aware that when this acute crisis has lifted, they will be expected to return for an in-person visit and all elements of standard local and hospital guidelines in order to continue this medication. Patient [address] [facility] address    HPI:    Pain is unchanged. Change in quality of symptoms:no. Medication side effects:none. Recent diagnostic testing:none. Recent interventional procedures:none.     He has been on anticoagulation medications to include NSAIDS and has not been on herbal supplements.  He is not diabetic.     Imaging:   MRI lumbar 2020 -                                           Potential Aberrant Drug-Related Behavior:  no     Urine Drug Screening:  3/2020 consistent     OARRS report:  4/2020 consistent  5/2020 consistent  6/2020 consistent  8/2020 consistent    Past Medical History: Reviewed    Past Surgical History: Reviewed     Home Medications: Reviewed    Allergies: Reviewed     Social History: Reviewed     REVIEW OF SYSTEMS:     Marc Polanco denies fever/chills, chest pain, shortness of breath, new bowel or bladder complaints. All other review of systems was negative.     PHYSICAL EXAMINATION:      General:       A & O x3  Build:Normal Weight    HEENT:    Head:normocephalic and atraumatic  Pupils:regular, round and equal.  Sclera: icterus absent     Lungs:    Breathing:non-labored    Lumbar spine:    Spine inspection:normal   Range of motion:abnormal mildly in lateral bending, flexion, extension rotation bilateral and is not painful. Musculoskeletal:    SLR:negative right, negative left, sitting     Extremities:    Tremors:None bilaterally upper  Range of motion:pain with internal rotation of hips not done. Intact:Yes    Neurological:     Motor:          No apparent weakness per patient       [address] [facility]    Dermatology:    Skin:no unusual rashes and no skin lesions    Impression:   Rt LBP RLE pain              MRI demonstrates disc bulges causing narrowing most severe at L4-5, right greater than left.                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 We discussed that these medications may cause drowsiness, sedation or dizziness and have counseled the patient not to drive or operate machinery. We have discussed that these medications will be prescribed only by one provider.  We have discussed with the patient about age related risk factors and have thoroughly discussed the importance of taking these medications as prescribed. The patient verbalizes understanding. Patient advised regarding steps to help prevent the spread of COVID-19   SOURCE - https://chetan-hayes.info/. html     1-Stay home except to get medical care  2-Clean your hands often for atleast 20 seconds, avoid touching: Avoid touching your eyes, nose, and mouth with unwashed hands. 3-Seek medical attention: Seek prompt medical attention if your illness is worsening (e.g., difficulty breathing). Call you doctor first.  3-Wear a facemask if you are sick   4-Cover your coughs and sneezes           I affirm this is a Patient Initiated Episode with an established Patient who has not had a related appointment within my department in the past 7 days or scheduled within the next 24 hours.     Total Time: 15 minutes     Cc: Referring physician

## 2020-08-26 ENCOUNTER — VIRTUAL VISIT (OUTPATIENT)
Dept: PAIN MANAGEMENT | Age: 65
End: 2020-08-26
Payer: COMMERCIAL

## 2020-08-26 PROCEDURE — 99213 OFFICE O/P EST LOW 20 MIN: CPT | Performed by: PAIN MEDICINE

## 2020-08-26 RX ORDER — GABAPENTIN 300 MG/1
300 CAPSULE ORAL 4 TIMES DAILY
Qty: 120 CAPSULE | Refills: 2 | Status: SHIPPED
Start: 2020-08-26 | End: 2020-11-20 | Stop reason: SDUPTHER

## 2020-08-26 NOTE — PROGRESS NOTES
Sofia Hernández was read the following message We want to confirm that, for purposes of billing, this is a virtual visit with your provider for which we will submit a claim for reimbursement with your insurance company. You will be responsible for any copays, coinsurance amounts or other amounts not covered by your insurance company. If you do not accept this, unfortunately we will not be able to schedule a virtual visit with the provider. Do you accept? Jonnie Yip responded Yes .

## 2020-11-18 NOTE — PROGRESS NOTES
[facility]  [address] [facility], [address] [place] [address] [place]  [phone]    Tele health follow up Note      Justus Brady     Date of Visit:  11/20/2020    CC:  Tele health follow up   Chief Complaint   Patient presents with    Other     general pain level 3/10       Consent:  The patient and/or health care decision maker is aware that he/she may receive a bill for this tele-health service [facility], depending on his insurance coverage, and has provided verbal consent to proceed: Yes  I have considered the risks of abuse, dependence, addiction and diversion. My patient is aware that they will need a follow-up visit (in-person or virtually) at the appropriate time indicated for continued medications. Further, my patient is aware that when this acute crisis has lifted, they will be expected to return for an in-person visit and all elements of standard local and hospital guidelines in order to continue this medication. Patient [address] [facility] [address] address    HPI:    Pain is unchanged. Change in quality of symptoms:no. Medication side effects:none. Recent diagnostic testing:none. Recent interventional procedures:none.     He has been on anticoagulation medications to include NSAIDS and has not been on herbal supplements.  He is not diabetic.     Imaging:   MRI lumbar 2020 -                                           Potential Aberrant Drug-Related Behavior:  no     Urine Drug Screening:  3/2020 consistent     OARRS report:  4/2020 consistent  5/2020 consistent  6/2020 consistent  8/2020 consistent  11/2020 consistent    Past Medical History: Reviewed    Past Surgical History: Reviewed     Home Medications: Reviewed    Allergies: Reviewed     Social History: Reviewed     REVIEW OF SYSTEMS:      Friendly denies fever/chills, chest pain, shortness of breath, new bowel or bladder complaints. All other review of systems was negative.     PHYSICAL EXAMINATION: General:       A & O x3  Build:Normal Weight    HEENT:    Head:normocephalic and atraumatic  Pupils:regular, round and equal  Sclera: icterus absent     Lungs:    Breathing:non-labored    Neurological:     Motor:          No apparent weakness per patient       [address] [facility]    Dermatology:    Skin:no unusual rashes and no skin lesions    Impression:   Rt LBP RLE pain              MRI demonstrates disc bulges causing narrowing most severe at L4-5, right greater than left.                Works doing metal fabrication (self-employed)      Plan:     OARRS report reviewed  Failed Meloxicam, cyclobenzaprine  Continue Gabapentin 300 mg titration 300/300/300 as tolerated, 2 RF, previously discussed common SE's, discussed no concomittant alcohol use, discussed monitoring for SE's and safety with his work w/ heavy machinery, encouraged to trial on weekend before going to work  Right L4 and L5 TFESI done 5/19/2020 gave 90% relief, repeat prn (prep for case done previously), he'd like to hold off on further  Pt declines PT, is doing his own HEP  Patient encouraged to stay active  Treatment plan discussed with the patient including medication and procedure side effects                    We discussed with the patient that combining opioids, benzodiazepines, alcohol, illicit drugs or sleep aids increases the risk of respiratory depression including death. We discussed that these medications may cause drowsiness, sedation or dizziness and have counseled the patient not to drive or operate machinery. We have discussed that these medications will be prescribed only by one provider. We have discussed with the patient about age related risk factors and have thoroughly discussed the importance of taking these medications as prescribed. The patient verbalizes understanding. Patient advised regarding steps to help prevent the spread of COVID-19   SOURCE - https://chetan-hayes.info/. html 1-Stay home except to get medical care  2-Clean your hands often for atleast 20 seconds, avoid touching: Avoid touching your eyes, nose, and mouth with unwashed hands. 3-Seek medical attention: Seek prompt medical attention if your illness is worsening (e.g., difficulty breathing). Call you doctor first.  3-Wear a facemask if you are sick   4-Cover your coughs and sneezes           I affirm this is a Patient Initiated Episode with an established Patient who has not had a related appointment within my department in the past 7 days or scheduled within the next 24 hours.     Total Time: 15 minutes     Cc: Referring physician

## 2020-11-20 ENCOUNTER — VIRTUAL VISIT (OUTPATIENT)
Dept: PAIN MANAGEMENT | Age: 65
End: 2020-11-20
Payer: COMMERCIAL

## 2020-11-20 PROCEDURE — 99213 OFFICE O/P EST LOW 20 MIN: CPT | Performed by: PAIN MEDICINE

## 2020-11-20 RX ORDER — GABAPENTIN 300 MG/1
300 CAPSULE ORAL 3 TIMES DAILY
Qty: 90 CAPSULE | Refills: 2 | Status: SHIPPED | OUTPATIENT
Start: 2020-11-20 | End: 2020-12-20

## 2020-11-20 NOTE — PROGRESS NOTES
Jenny Talamantes was read the following message We want to confirm that, for purposes of billing, this is a virtual visit with your provider for which we will submit a claim for reimbursement with your insurance company. You will be responsible for any copays, coinsurance amounts or other amounts not covered by your insurance company. If you do not accept this, unfortunately we will not be able to schedule a virtual visit with the provider. Do you accept?  Alex Quan responded Fabien Carrizales

## (undated) DEVICE — 12 ML SYRINGE,LUER-LOCK TIP: Brand: MONOJECT

## (undated) DEVICE — NEEDLE HYPO 25GA L1.5IN BLU POLYPR HUB S STL REG BVL STR

## (undated) DEVICE — Z DISCONTINUED APPLICATOR SURG PREP 0.35OZ 2% CHG 70% ISO ALC W/ HI LT

## (undated) DEVICE — BANDAGE ADH W1XL3IN NAT FAB WVN FLX DURABLE N ADH PD SEAL

## (undated) DEVICE — 3M™ RED DOT™ MONITORING ELECTRODE WITH FOAM TAPE AND STICKY GEL 2560, 50/BAG, 20/CASE, 72/PLT: Brand: RED DOT™

## (undated) DEVICE — NEEDLE HYPO 18GA L1.5IN PNK POLYPR HUB S STL THN WALL FILL

## (undated) DEVICE — 6 ML SYRINGE LUER-LOCK TIP: Brand: MONOJECT